# Patient Record
(demographics unavailable — no encounter records)

---

## 2025-03-15 NOTE — HISTORY OF PRESENT ILLNESS
[de-identified] : 44-year-old Thai speaking male presenting as a new patient for annual health maintenance and physical examination.  of Jason Loera. Patient reports nasal congestion, snoring, and occasional nosebleeds. He has a history of hypertension, alcohol use disorder, constipation, and nicotine dependence. Works in marine maintenance. Lives with wife and two adult daughters (21 and 19 years old). Smokes cigarettes a pack per day for 24 years. Drinks alcohol (two large beers daily). He has tried to quit smoking at least 4 separate times but keeps going back. He is interested in quitting but unsure how successful he will be.

## 2025-03-15 NOTE — COUNSELING
[Cessation strategies including cessation program discussed] : Cessation strategies including cessation program discussed [Use of nicotine replacement therapies and other medications discussed] : Use of nicotine replacement therapies and other medications discussed [Encouraged to pick a quit date and identify support needed to quit] : Encouraged to pick a quit date and identify support needed to quit [Yes] : Willing to quit smoking [FreeTextEntry3] : 30

## 2025-03-15 NOTE — INTERPRETER SERVICES
[Other: ______] : provided by DUSTIN [Time Spent: ____ minutes] : Total time spent using  services: [unfilled] minutes. The patient's primary language is not English thus required  services. [Interpreters_IDNumber] : [id number]

## 2025-03-15 NOTE — HISTORY OF PRESENT ILLNESS
[de-identified] : 44-year-old Ukrainian speaking male presenting as a new patient for annual health maintenance and physical examination.  of Jason Loera. Patient reports nasal congestion, snoring, and occasional nosebleeds. He has a history of hypertension, alcohol use disorder, constipation, and nicotine dependence. Works in marine maintenance. Lives with wife and two adult daughters (21 and 19 years old). Smokes cigarettes a pack per day for 24 years. Drinks alcohol (two large beers daily). He has tried to quit smoking at least 4 separate times but keeps going back. He is interested in quitting but unsure how successful he will be.

## 2025-03-15 NOTE — INTERPRETER SERVICES
[Other: ______] : provided by DUSTIN [Time Spent: ____ minutes] : Total time spent using  services: [unfilled] minutes. The patient's primary language is not English thus required  services. [Interpreters_IDNumber] : [id number]

## 2025-06-23 NOTE — PHYSICAL EXAM
[Normal] : soft, non-tender, non-distended, no masses palpated, no HSM and normal bowel sounds [de-identified] : no joint hypertrophy, full range of motion [de-identified] : bilateral onychomycosis

## 2025-06-23 NOTE — HISTORY OF PRESENT ILLNESS
[de-identified] : 44-year-old male presenting for ongoing management of chronic medical conditions.  of Peri Loera. Today, he reports three main concerns. Firstly, he is still smoking, though he has reduced his consumption from 20 cigarettes per day to 4-5 per day. He stopped taking Varenicline (Chantix) but expresses interest in restarting it. 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 The pain is more pronounced in his right hand, despite being left-handed. Lastly, he mentions ongoing issues with fungal infections in his toenails and fingernails, which have persisted for many years despite over-the-counter treatments.